# Patient Record
Sex: FEMALE | Race: WHITE | Employment: UNEMPLOYED | URBAN - METROPOLITAN AREA
[De-identification: names, ages, dates, MRNs, and addresses within clinical notes are randomized per-mention and may not be internally consistent; named-entity substitution may affect disease eponyms.]

---

## 2018-04-04 PROBLEM — G89.29 CHRONIC PAIN: Status: ACTIVE | Noted: 2018-04-04

## 2018-04-04 PROBLEM — I63.9 CVA (CEREBRAL VASCULAR ACCIDENT) (HCC): Status: ACTIVE | Noted: 2018-04-04

## 2018-04-04 PROBLEM — I25.10 CAD (CORONARY ARTERY DISEASE): Status: ACTIVE | Noted: 2018-04-04

## 2022-03-19 PROBLEM — I63.9 CVA (CEREBRAL VASCULAR ACCIDENT) (HCC): Status: ACTIVE | Noted: 2018-04-04

## 2022-03-19 PROBLEM — G89.29 CHRONIC PAIN: Status: ACTIVE | Noted: 2018-04-04

## 2023-02-20 ENCOUNTER — APPOINTMENT (OUTPATIENT)
Dept: GENERAL RADIOLOGY | Age: 63
End: 2023-02-20
Attending: EMERGENCY MEDICINE
Payer: MEDICARE

## 2023-02-20 ENCOUNTER — HOSPITAL ENCOUNTER (OUTPATIENT)
Age: 63
Setting detail: OBSERVATION
Discharge: HOME | End: 2023-02-20
Attending: EMERGENCY MEDICINE | Admitting: INTERNAL MEDICINE
Payer: MEDICARE

## 2023-02-20 VITALS
RESPIRATION RATE: 22 BRPM | OXYGEN SATURATION: 93 % | WEIGHT: 165 LBS | SYSTOLIC BLOOD PRESSURE: 126 MMHG | HEART RATE: 82 BPM | DIASTOLIC BLOOD PRESSURE: 63 MMHG | HEIGHT: 65 IN | TEMPERATURE: 97.4 F | BODY MASS INDEX: 27.49 KG/M2

## 2023-02-20 DIAGNOSIS — G43.009 MIGRAINE WITHOUT AURA AND WITHOUT STATUS MIGRAINOSUS, NOT INTRACTABLE: ICD-10-CM

## 2023-02-20 DIAGNOSIS — R10.13 ABDOMINAL PAIN, EPIGASTRIC: ICD-10-CM

## 2023-02-20 DIAGNOSIS — I10 HYPERTENSION, UNSPECIFIED TYPE: ICD-10-CM

## 2023-02-20 DIAGNOSIS — R77.8 ELEVATED TROPONIN: Primary | ICD-10-CM

## 2023-02-20 PROBLEM — D72.829 LEUKOCYTOSIS: Status: ACTIVE | Noted: 2023-02-20

## 2023-02-20 PROBLEM — F41.9 ANXIETY AND DEPRESSION: Status: ACTIVE | Noted: 2023-02-20

## 2023-02-20 PROBLEM — R11.2 NAUSEA & VOMITING: Status: ACTIVE | Noted: 2023-02-20

## 2023-02-20 PROBLEM — R79.89 ELEVATED TROPONIN: Status: RESOLVED | Noted: 2023-02-20 | Resolved: 2023-02-20

## 2023-02-20 PROBLEM — R79.89 ELEVATED TROPONIN: Status: ACTIVE | Noted: 2023-02-20

## 2023-02-20 PROBLEM — R21 RASH: Status: ACTIVE | Noted: 2023-02-20

## 2023-02-20 PROBLEM — I63.9 CVA (CEREBRAL VASCULAR ACCIDENT) (HCC): Status: RESOLVED | Noted: 2018-04-04 | Resolved: 2023-02-20

## 2023-02-20 PROBLEM — F32.A ANXIETY AND DEPRESSION: Status: ACTIVE | Noted: 2023-02-20

## 2023-02-20 LAB
ALBUMIN SERPL-MCNC: 3.9 G/DL (ref 3.5–5)
ALBUMIN/GLOB SERPL: 1.3 (ref 1.1–2.2)
ALP SERPL-CCNC: 83 U/L (ref 45–117)
ALT SERPL-CCNC: 52 U/L (ref 12–78)
ANION GAP SERPL CALC-SCNC: 11 MMOL/L (ref 5–15)
APPEARANCE UR: ABNORMAL
AST SERPL-CCNC: 29 U/L (ref 15–37)
B PERT DNA SPEC QL NAA+PROBE: NOT DETECTED
BACTERIA URNS QL MICRO: NEGATIVE /HPF
BASOPHILS # BLD: 0 K/UL (ref 0–0.1)
BASOPHILS NFR BLD: 0 % (ref 0–1)
BILIRUB SERPL-MCNC: 0.4 MG/DL (ref 0.2–1)
BILIRUB UR QL: NEGATIVE
BORDETELLA PARAPERTUSSIS PCR, BORPAR: NOT DETECTED
BUN SERPL-MCNC: 31 MG/DL (ref 6–20)
BUN/CREAT SERPL: 30 (ref 12–20)
C PNEUM DNA SPEC QL NAA+PROBE: NOT DETECTED
CALCIUM SERPL-MCNC: 10.4 MG/DL (ref 8.5–10.1)
CHLORIDE SERPL-SCNC: 100 MMOL/L (ref 97–108)
CHOLEST SERPL-MCNC: 136 MG/DL
CO2 SERPL-SCNC: 29 MMOL/L (ref 21–32)
COLOR UR: ABNORMAL
COMMENT, HOLDF: NORMAL
CREAT SERPL-MCNC: 1.02 MG/DL (ref 0.55–1.02)
DIFFERENTIAL METHOD BLD: ABNORMAL
EOSINOPHIL # BLD: 0.6 K/UL (ref 0–0.4)
EOSINOPHIL NFR BLD: 3 % (ref 0–7)
EPITH CASTS URNS QL MICRO: ABNORMAL /LPF
ERYTHROCYTE [DISTWIDTH] IN BLOOD BY AUTOMATED COUNT: 13.8 % (ref 11.5–14.5)
ETHANOL SERPL-MCNC: <10 MG/DL
FLUAV SUBTYP SPEC NAA+PROBE: NOT DETECTED
FLUBV RNA SPEC QL NAA+PROBE: NOT DETECTED
GLOBULIN SER CALC-MCNC: 3 G/DL (ref 2–4)
GLUCOSE SERPL-MCNC: 116 MG/DL (ref 65–100)
GLUCOSE UR STRIP.AUTO-MCNC: NEGATIVE MG/DL
HADV DNA SPEC QL NAA+PROBE: NOT DETECTED
HCOV 229E RNA SPEC QL NAA+PROBE: NOT DETECTED
HCOV HKU1 RNA SPEC QL NAA+PROBE: NOT DETECTED
HCOV NL63 RNA SPEC QL NAA+PROBE: NOT DETECTED
HCOV OC43 RNA SPEC QL NAA+PROBE: NOT DETECTED
HCT VFR BLD AUTO: 45.5 % (ref 35–47)
HDLC SERPL-MCNC: 59 MG/DL
HDLC SERPL: 2.3 (ref 0–5)
HGB BLD-MCNC: 15.5 G/DL (ref 11.5–16)
HGB UR QL STRIP: NEGATIVE
HMPV RNA SPEC QL NAA+PROBE: NOT DETECTED
HPIV1 RNA SPEC QL NAA+PROBE: NOT DETECTED
HPIV2 RNA SPEC QL NAA+PROBE: NOT DETECTED
HPIV3 RNA SPEC QL NAA+PROBE: NOT DETECTED
HPIV4 RNA SPEC QL NAA+PROBE: NOT DETECTED
IMM GRANULOCYTES # BLD AUTO: 0 K/UL
IMM GRANULOCYTES NFR BLD AUTO: 0 %
KETONES UR QL STRIP.AUTO: NEGATIVE MG/DL
LDLC SERPL CALC-MCNC: 44.2 MG/DL (ref 0–100)
LEUKOCYTE ESTERASE UR QL STRIP.AUTO: NEGATIVE
LYMPHOCYTES # BLD: 8.1 K/UL (ref 0.8–3.5)
LYMPHOCYTES NFR BLD: 43 % (ref 12–49)
M PNEUMO DNA SPEC QL NAA+PROBE: NOT DETECTED
MCH RBC QN AUTO: 31.8 PG (ref 26–34)
MCHC RBC AUTO-ENTMCNC: 34.1 G/DL (ref 30–36.5)
MCV RBC AUTO: 93.4 FL (ref 80–99)
MONOCYTES # BLD: 1.5 K/UL (ref 0–1)
MONOCYTES NFR BLD: 8 % (ref 5–13)
NEUTS BAND NFR BLD MANUAL: 2 % (ref 0–6)
NEUTS SEG # BLD: 8.7 K/UL (ref 1.8–8)
NEUTS SEG NFR BLD: 44 % (ref 32–75)
NITRITE UR QL STRIP.AUTO: NEGATIVE
NRBC # BLD: 0 K/UL (ref 0–0.01)
NRBC BLD-RTO: 0 PER 100 WBC
PH UR STRIP: 7.5 (ref 5–8)
PLATELET # BLD AUTO: 200 K/UL (ref 150–400)
PMV BLD AUTO: 11.1 FL (ref 8.9–12.9)
POTASSIUM SERPL-SCNC: 3.3 MMOL/L (ref 3.5–5.1)
PROCALCITONIN SERPL-MCNC: <0.05 NG/ML
PROT SERPL-MCNC: 6.9 G/DL (ref 6.4–8.2)
PROT UR STRIP-MCNC: ABNORMAL MG/DL
RBC # BLD AUTO: 4.87 M/UL (ref 3.8–5.2)
RBC #/AREA URNS HPF: ABNORMAL /HPF (ref 0–5)
RBC MORPH BLD: ABNORMAL
RSV RNA SPEC QL NAA+PROBE: NOT DETECTED
RV+EV RNA SPEC QL NAA+PROBE: NOT DETECTED
SAMPLES BEING HELD,HOLD: NORMAL
SARS-COV-2 RDRP RESP QL NAA+PROBE: NOT DETECTED
SARS-COV-2 RNA RESP QL NAA+PROBE: NOT DETECTED
SODIUM SERPL-SCNC: 140 MMOL/L (ref 136–145)
SOURCE, COVRS: NORMAL
SP GR UR REFRACTOMETRY: 1.02 (ref 1–1.03)
TRIGL SERPL-MCNC: 164 MG/DL (ref ?–150)
TROPONIN I SERPL HS-MCNC: 12 NG/L (ref 0–51)
TROPONIN I SERPL HS-MCNC: 52 NG/L (ref 0–51)
TROPONIN I SERPL HS-MCNC: 68 NG/L (ref 0–51)
UR CULT HOLD, URHOLD: NORMAL
UROBILINOGEN UR QL STRIP.AUTO: 0.2 EU/DL (ref 0.2–1)
VLDLC SERPL CALC-MCNC: 32.8 MG/DL
WBC # BLD AUTO: 18.9 K/UL (ref 3.6–11)
WBC MORPH BLD: ABNORMAL
WBC URNS QL MICRO: ABNORMAL /HPF (ref 0–4)

## 2023-02-20 PROCEDURE — 0202U NFCT DS 22 TRGT SARS-COV-2: CPT

## 2023-02-20 PROCEDURE — 87635 SARS-COV-2 COVID-19 AMP PRB: CPT

## 2023-02-20 PROCEDURE — 93005 ELECTROCARDIOGRAM TRACING: CPT

## 2023-02-20 PROCEDURE — 84484 ASSAY OF TROPONIN QUANT: CPT

## 2023-02-20 PROCEDURE — 74011250636 HC RX REV CODE- 250/636: Performed by: EMERGENCY MEDICINE

## 2023-02-20 PROCEDURE — 74011250637 HC RX REV CODE- 250/637: Performed by: EMERGENCY MEDICINE

## 2023-02-20 PROCEDURE — 96375 TX/PRO/DX INJ NEW DRUG ADDON: CPT

## 2023-02-20 PROCEDURE — 74011250637 HC RX REV CODE- 250/637: Performed by: INTERNAL MEDICINE

## 2023-02-20 PROCEDURE — 85025 COMPLETE CBC W/AUTO DIFF WBC: CPT

## 2023-02-20 PROCEDURE — 80053 COMPREHEN METABOLIC PANEL: CPT

## 2023-02-20 PROCEDURE — 99285 EMERGENCY DEPT VISIT HI MDM: CPT

## 2023-02-20 PROCEDURE — 84145 PROCALCITONIN (PCT): CPT

## 2023-02-20 PROCEDURE — G0378 HOSPITAL OBSERVATION PER HR: HCPCS

## 2023-02-20 PROCEDURE — 74011250636 HC RX REV CODE- 250/636: Performed by: INTERNAL MEDICINE

## 2023-02-20 PROCEDURE — 82077 ASSAY SPEC XCP UR&BREATH IA: CPT

## 2023-02-20 PROCEDURE — 71045 X-RAY EXAM CHEST 1 VIEW: CPT

## 2023-02-20 PROCEDURE — 81001 URINALYSIS AUTO W/SCOPE: CPT

## 2023-02-20 PROCEDURE — 96374 THER/PROPH/DIAG INJ IV PUSH: CPT

## 2023-02-20 PROCEDURE — 80061 LIPID PANEL: CPT

## 2023-02-20 PROCEDURE — 74011000250 HC RX REV CODE- 250: Performed by: EMERGENCY MEDICINE

## 2023-02-20 PROCEDURE — 36415 COLL VENOUS BLD VENIPUNCTURE: CPT

## 2023-02-20 RX ORDER — ONDANSETRON 2 MG/ML
4 INJECTION INTRAMUSCULAR; INTRAVENOUS
Status: DISCONTINUED | OUTPATIENT
Start: 2023-02-20 | End: 2023-02-20 | Stop reason: HOSPADM

## 2023-02-20 RX ORDER — METOPROLOL TARTRATE 25 MG/1
25 TABLET, FILM COATED ORAL ONCE
Status: COMPLETED | OUTPATIENT
Start: 2023-02-20 | End: 2023-02-20

## 2023-02-20 RX ORDER — FAMOTIDINE 20 MG/1
40 TABLET, FILM COATED ORAL 2 TIMES DAILY
Status: DISCONTINUED | OUTPATIENT
Start: 2023-02-20 | End: 2023-02-20 | Stop reason: HOSPADM

## 2023-02-20 RX ORDER — ONDANSETRON 4 MG/1
8 TABLET, ORALLY DISINTEGRATING ORAL
Status: COMPLETED | OUTPATIENT
Start: 2023-02-20 | End: 2023-02-20

## 2023-02-20 RX ORDER — ACETAMINOPHEN 500 MG
1000 TABLET ORAL ONCE
Status: COMPLETED | OUTPATIENT
Start: 2023-02-20 | End: 2023-02-20

## 2023-02-20 RX ORDER — METOPROLOL SUCCINATE 25 MG/1
50 TABLET, EXTENDED RELEASE ORAL EVERY EVENING
Status: DISCONTINUED | OUTPATIENT
Start: 2023-02-20 | End: 2023-02-20 | Stop reason: HOSPADM

## 2023-02-20 RX ORDER — FAMOTIDINE 20 MG/1
20 TABLET, FILM COATED ORAL ONCE
Status: DISCONTINUED | OUTPATIENT
Start: 2023-02-20 | End: 2023-02-20

## 2023-02-20 RX ORDER — ACETAMINOPHEN 650 MG/1
650 SUPPOSITORY RECTAL
Status: DISCONTINUED | OUTPATIENT
Start: 2023-02-20 | End: 2023-02-20 | Stop reason: HOSPADM

## 2023-02-20 RX ORDER — GUAIFENESIN 100 MG/5ML
324 LIQUID (ML) ORAL
Status: COMPLETED | OUTPATIENT
Start: 2023-02-20 | End: 2023-02-20

## 2023-02-20 RX ORDER — SODIUM CHLORIDE 0.9 % (FLUSH) 0.9 %
5-40 SYRINGE (ML) INJECTION EVERY 8 HOURS
Status: DISCONTINUED | OUTPATIENT
Start: 2023-02-20 | End: 2023-02-20 | Stop reason: HOSPADM

## 2023-02-20 RX ORDER — GABAPENTIN 300 MG/1
300 CAPSULE ORAL 2 TIMES DAILY
Status: DISCONTINUED | OUTPATIENT
Start: 2023-02-20 | End: 2023-02-20 | Stop reason: HOSPADM

## 2023-02-20 RX ORDER — POLYETHYLENE GLYCOL 3350 17 G/17G
17 POWDER, FOR SOLUTION ORAL DAILY PRN
Status: DISCONTINUED | OUTPATIENT
Start: 2023-02-20 | End: 2023-02-20 | Stop reason: HOSPADM

## 2023-02-20 RX ORDER — DIPHENHYDRAMINE HYDROCHLORIDE 50 MG/ML
50 INJECTION, SOLUTION INTRAMUSCULAR; INTRAVENOUS
Status: COMPLETED | OUTPATIENT
Start: 2023-02-20 | End: 2023-02-20

## 2023-02-20 RX ORDER — SODIUM CHLORIDE 0.9 % (FLUSH) 0.9 %
5-40 SYRINGE (ML) INJECTION AS NEEDED
Status: DISCONTINUED | OUTPATIENT
Start: 2023-02-20 | End: 2023-02-20 | Stop reason: HOSPADM

## 2023-02-20 RX ORDER — BUTALBITAL, ACETAMINOPHEN AND CAFFEINE 50; 325; 40 MG/1; MG/1; MG/1
1 TABLET ORAL
Status: DISCONTINUED | OUTPATIENT
Start: 2023-02-20 | End: 2023-02-20 | Stop reason: HOSPADM

## 2023-02-20 RX ORDER — POTASSIUM CHLORIDE, DEXTROSE MONOHYDRATE AND SODIUM CHLORIDE 300; 5; 900 MG/100ML; G/100ML; MG/100ML
INJECTION, SOLUTION INTRAVENOUS CONTINUOUS
Status: DISCONTINUED | OUTPATIENT
Start: 2023-02-20 | End: 2023-02-20 | Stop reason: HOSPADM

## 2023-02-20 RX ORDER — ACETAMINOPHEN 325 MG/1
650 TABLET ORAL
Status: DISCONTINUED | OUTPATIENT
Start: 2023-02-20 | End: 2023-02-20 | Stop reason: HOSPADM

## 2023-02-20 RX ORDER — ONDANSETRON 4 MG/1
4 TABLET, ORALLY DISINTEGRATING ORAL
Status: DISCONTINUED | OUTPATIENT
Start: 2023-02-20 | End: 2023-02-20 | Stop reason: HOSPADM

## 2023-02-20 RX ORDER — PROCHLORPERAZINE EDISYLATE 5 MG/ML
10 INJECTION INTRAMUSCULAR; INTRAVENOUS
Status: COMPLETED | OUTPATIENT
Start: 2023-02-20 | End: 2023-02-20

## 2023-02-20 RX ORDER — ENOXAPARIN SODIUM 100 MG/ML
40 INJECTION SUBCUTANEOUS DAILY
Status: DISCONTINUED | OUTPATIENT
Start: 2023-02-20 | End: 2023-02-20 | Stop reason: HOSPADM

## 2023-02-20 RX ORDER — HYDROXYZINE 25 MG/1
25 TABLET, FILM COATED ORAL
Status: COMPLETED | OUTPATIENT
Start: 2023-02-20 | End: 2023-02-20

## 2023-02-20 RX ORDER — GUAIFENESIN 100 MG/5ML
81 LIQUID (ML) ORAL DAILY
Status: DISCONTINUED | OUTPATIENT
Start: 2023-02-20 | End: 2023-02-20 | Stop reason: HOSPADM

## 2023-02-20 RX ORDER — ATORVASTATIN CALCIUM 20 MG/1
10 TABLET, FILM COATED ORAL
Status: DISCONTINUED | OUTPATIENT
Start: 2023-02-20 | End: 2023-02-20 | Stop reason: HOSPADM

## 2023-02-20 RX ADMIN — ACETAMINOPHEN 1000 MG: 500 TABLET ORAL at 02:00

## 2023-02-20 RX ADMIN — DIPHENHYDRAMINE HYDROCHLORIDE 50 MG: 50 INJECTION, SOLUTION INTRAMUSCULAR; INTRAVENOUS at 02:00

## 2023-02-20 RX ADMIN — PROCHLORPERAZINE EDISYLATE 10 MG: 5 INJECTION INTRAMUSCULAR; INTRAVENOUS at 01:59

## 2023-02-20 RX ADMIN — SODIUM CHLORIDE 500 ML: 9 INJECTION, SOLUTION INTRAVENOUS at 01:56

## 2023-02-20 RX ADMIN — ONDANSETRON 8 MG: 4 TABLET, ORALLY DISINTEGRATING ORAL at 02:00

## 2023-02-20 RX ADMIN — ASPIRIN 81 MG: 81 TABLET, CHEWABLE ORAL at 09:00

## 2023-02-20 RX ADMIN — GABAPENTIN 300 MG: 300 CAPSULE ORAL at 09:00

## 2023-02-20 RX ADMIN — ALUMINUM HYDROXIDE, MAGNESIUM HYDROXIDE, AND SIMETHICONE 40 ML: 200; 200; 20 SUSPENSION ORAL at 07:54

## 2023-02-20 RX ADMIN — FAMOTIDINE 20 MG: 10 INJECTION, SOLUTION INTRAVENOUS at 01:59

## 2023-02-20 RX ADMIN — ASPIRIN 324 MG: 81 TABLET, CHEWABLE ORAL at 02:00

## 2023-02-20 RX ADMIN — BUTALBITAL, ACETAMINOPHEN, AND CAFFEINE 1 TABLET: 50; 325; 40 TABLET ORAL at 09:00

## 2023-02-20 RX ADMIN — ALUMINUM HYDROXIDE AND MAGNESIUM HYDROXIDE 15 ML: 200; 200 SUSPENSION ORAL at 10:28

## 2023-02-20 RX ADMIN — HYDROXYZINE HYDROCHLORIDE 25 MG: 25 TABLET, FILM COATED ORAL at 07:54

## 2023-02-20 RX ADMIN — FAMOTIDINE 40 MG: 20 TABLET, FILM COATED ORAL at 07:54

## 2023-02-20 RX ADMIN — METOPROLOL TARTRATE 25 MG: 25 TABLET, FILM COATED ORAL at 02:01

## 2023-02-20 NOTE — PROGRESS NOTES
Office [facility] (8/14/19): Indication:  Cardiac risk stratification and evaluation of ischemia. Procedure: The patient's heart rate was 76 bpm at baseline. Resting BP was 130/84. Patient had a total dose of 0.4mg/5ml injection of Regadenoson immediately flushed with 5 ml of saline intravenously administered over 10 seconds. Following the injection, heart rate was 107 bpm and laurel BP was 120/80. Symptoms during stress include headache, shortness of breath and Heavy all over, cough. Imaging procedure: The patient had myocardial perfusion imaging performed using a 1 day stress imaging protocol. with the intravenous injection of 29.5 mCi of Tc99m Tetrofosmin Myoview at stress, 75 mg of IV aminophylline was required. Baseline electrocardiogram demonstrates normal sinus rhythm and is within normal limits. The patient had no EKG changes suggestive of ischemia. Findings: The overall quality of the study was good. The left ventricle was normal in size. There were no myocardial perfusion defects. LVEF was calculated at 55%. Impression: Normal myocardial perfusion Tetrofosmin SPECT imaging.

## 2023-02-20 NOTE — ED NOTES
Report received from [address]. This RN introduced self to patient this AM. Pt was unhooked from monitor at this time. This RN put patient back on monitor. Pt complains of her headache and CP coming back. EKG completed and doctor notified.
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
## 2023-02-20 NOTE — DISCHARGE SUMMARY
Physician Discharge Summary     Patient ID:  Darron Noel  [id number]  73 y.o.  1960    Admit date: 2/20/2023    Discharge date of service and time: 2/20/2023  Greater than 30 minutes were spent providing discharge related services for this patient    Admission Diagnoses: Elevated troponin [R77.8]    Discharge Diagnoses:    Principal Diagnosis   Nausea & vomiting                                             Hospital Course and other diagnoses    Nausea & vomiting / Headache - POA, mild, resolving. Likely anxiety vs food poisoning vs viral. Clears. ADAT. Prn zofran. Xray abd if worsens. DC home if she eats     Dehydration / Hypokalemia / Hypercalcemia - POA due to poor PO intake, and GI loss. Hydrate and replete K in IVF. Chronic pain / Scoliosis / DDD (degenerative disc disease), lumbar / Arthritis / Cervical myopathy - POA, followed by pain specialist. Continue gabapentin     CAD (coronary artery disease) / Hypertension / Hyperlipidemia - POA, chronic, per patient. I revived external records from [facility]. Minimally elevated troponin is due to strain in setting of dehydraiton. EKG bland. No further workup warranted. Resume ASA, metoprolol and statin. Anxiety and depression / Fibromyalgia - POA, reports PRN xanax. Severe, and likely responsible for pain, dyspnea, etc.  Would benefit from counseling and SSRI or similar agents. Leukocytosis - POA, likely reactive or viral.  Xary without PNA. UA without UTI. Negative procalcitonin. No Abx     Rash - POA, mild, likely stress vs viral or contact. Has been on steroid taper for this. Has seen allergist. Supportive care. Outpatient derm if persists      PCP: Amelia Velásquez MD    Consults: None    Significant Diagnostic Studies: See Hospital Course    Discharged home in improved condition.     Discharge Exam:  BP (!) 142/89   Pulse 62   Temp 97.7 °F (36.5 °C)   Resp 18   Ht 5' 5\" (1.651 m)   Wt 74.8 kg (165 lb)   SpO2 95%   BMI 27.46 kg/m² Gen:  Well-developed, well-nourished, in no acute distress  HEENT:  Pink conjunctivae, PERRL, hearing intact to voice, moist mucous membranes  Neck:  Supple, without masses, thyroid non-tender  Resp:  No accessory muscle use, clear breath sounds without wheezes rales or rhonchi  Card:  No murmurs, normal S1, S2 without thrills, bruits or peripheral edema  Abd:  Soft, non-tender, non-distended, normoactive bowel sounds are present, no mass  Lymph:  No cervical or inguinal adenopathy  Musc:  No cyanosis or clubbing  Skin:  No rashes or ulcers, skin turgor is good  Neuro:  Cranial nerves are grossly intact, no focal motor weakness, follows commands appropriately  Psych: Moderate insight, oriented to person, place and time, alert    Patient Instructions:   Current Discharge Medication List        CONTINUE these medications which have NOT CHANGED    Details   metoprolol succinate (TOPROL-XL) 50 mg XL tablet TAKE 1 TABLET BY MOUTH EVERY DAY  Qty: 30 Tab, Refills: 5    Associated Diagnoses: Essential hypertension      lovastatin (MEVACOR) 40 mg tablet Take 1 Tab by mouth daily. Qty: 90 Tab, Refills: 1      gabapentin (NEURONTIN) 300 mg capsule Take 600 mg by mouth two (2) times a day. butalbital-acetaminophen-caffeine (FIORICET, ESGIC) -40 mg per tablet TAKE 1 TABLET BY MOUTH EVERY DAY AS NEEDED  Qty: 30 Tab, Refills: 1    Associated Diagnoses: Intractable migraine without status migrainosus, unspecified migraine type      ALPRAZolam (XANAX) 0.5 mg tablet TAKE 1 TABLET BY MOUTH EVERY DAY AT BEDTIME AS NEEDED FOR ANXIETY  Qty: 30 Tab, Refills: 0    Comments: Not to exceed 5 additional fills before 08/17/2019  Associated Diagnoses: Anxiety      aspirin (ASPIRIN) 325 mg tablet Take 1 Tab by mouth daily.   Qty: 30 Tab, Refills: 0           STOP taking these medications       leflunomide (ARAVA) 20 mg tablet Comments:   Reason for Stopping:             Activity: Activity as tolerated  Diet: Cardiac Diet and Low fat, Low cholesterol  Wound Care: None needed    Follow-up with your PCP in a few weeks.   Follow-up tests/labs - none    Signed:  Liang Mc MD  2/20/2023  10:52 AM

## 2023-02-20 NOTE — PROGRESS NOTES
Medicare Outpatient Observation Notice (MOON)/ [facility] Outpatient Observation Notice (Sara Regan) provided to patient/representative with verbal explanation of the notice. Time allotted for questions regarding the notice. Patient /representative provided a completed copy of the MOON/VOON notice. Copy placed on bedside chart.   Christian Hay CMS

## 2023-02-20 NOTE — ED NOTES
Patient reports pain with swallowing water and heart rhythm changed during episode. EKG performed and troponin sent. ED MD notified and Dr. Taylor Adams notified. Orders received to administer Mylanta.

## 2023-02-20 NOTE — ED NOTES
I reexamined this patient upon arrival, she presented as a transfer from the [facility] emergency department. Continues to have episodic substernal chest pain with intermittent sinus pauses and bradycardia to the upper 30s, low 40s while maintaining good systolic pressures and normal mentation. Repeat EKG at 05 55 was reviewed by me and indicates sinus bradycardia with sinus arrhythmia at a rate of 46, axis is normal, QRS interval is normal, there are no ST or T wave changes suggesting acute ischemia. We've repeated her troponin which is pending and I've paged the on call cardiologist for her practice, Dr. Leidy Diaz. She continues to smoke half a pack per day, has an established history of coronary artery disease by cardiac cath in 2007, has not had a repeat cath since then. I've made the admitting hospitalist (Dr. Taylor Adams) aware as well.

## 2023-02-20 NOTE — ED TRIAGE NOTES
Pt presents to the ER via ambulance with c/o HA, nausea, vomiting onset tonight. Also reports rash x 2 weeks that \"driving me crazy\". On arrival patient off of EMS stretcher and ambulatory to bathroom, urine sample obtained. Patient ambulated to bathroom and back to room with steady gait.
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
## 2023-02-20 NOTE — PROGRESS NOTES
Dr. Doc Iraheta office note from 8/13/19:      James Billings 1960   Office/Outpatient Visit  Visit Date: Tue, Aug 13, 2019 10:15 am  Provider: Jacqueline Del Toro MD (Assistant: Moises Hoffman LPN, LPN)  Location: [facility])- [address] [place] [address] Louann Levine. [id number] [phone]    Electronically signed by Jacqueline Del Toro MD on  08/13/2019 11:14:04 AM                           SUBJECTIVE:    CC:   Mrs. Angie Youssef is a 64year old White female. Patient states her PCP has been changed to Shashi Bellamy MD.  New Patient- Not seen in 3 years This is a recent hospital stay follow-up visit. She is here today following a transition of care from the emergency department. She was recently in the hospital: on a recent  ER visit on 5/21/19 with chest pain at [place] [address]. She has a history of coronary artery disease of the native coronary artery, hypercholesterolemia,  essential hypertension, myocardial infarction, and transient ischemic attack (TIA). She presents for evaluation of chest pain. HPI:         Regarding CAD: She is here today hospital follow-up. Current symptoms include chest pain. The patient has had prior echocardiogram ( performed 4/5/18- EF 60-65% ). A cardiac cath has been done ( performed 2005 ). Regarding hypertension: Type Primary Hypertension Currently, her treatment regimen consists of Toprol-XL. TIA noted. No more that she is aware of. Told her right carotid artery was classified. Hypercholesterolemia: Current treatment includes Mevacor and diet. Regarding chest pain: She characterizes the pain as sharp. Went to [facility] pain management in April - given Topamax for pain in back and then started having CP. She called them told her to go to ER - May 21st - did EKG and this was good and not sure if did blood work at that time per pt. Review of records they did a Troponin and was ok.    Since stopping medication still has chest pain and radiate to her left shoulder. Will also have episodes of sharp pain. The pain will ease up if takes a couple of 325 mg of aspirin she states. Not pleuritic in nature. Does not hurt when lifts arms above head. Also states soreness in chest on left side and different from the \"constant pain\". She is SOB at times carlyle if laying on her back. Also found nodule on thyroid she states. Also states when walks she can stumble to the right. ROS:   Discussed relevant lab and imaging studies along with the plan of treatment with the nurse. EYES:  Negative for blurred vision and eye pain. E/N/T:  Negative for epistaxis and hoarseness. CARDIOVASCULAR:  Please see HPI. RESPIRATORY:  Negative for cough and hemoptysis. GASTROINTESTINAL:  Negative for abdominal pain and dysphagia. MUSCULOSKELETAL:  Negative for arthralgias and back pain. NEUROLOGICAL:  Negative for headaches, paresthesias, and weakness. HEMATOLOGIC/LYMPHATIC:  Negative for easy bruising, bleeding, and lymphadenopathy. PSYCHIATRIC:  No symptoms reported. PMH/FMH/SH:   Last Reviewed on 8/13/2019 10:58 AM by Ladan Hale LPN  Past Medical History:     Coronary artery disease  Hypercholesterolemia  Hypertension  Myocardial Infarction  Echocardiogram 04/07/05 - Normal LV systolic performance. Trace MR and mild TR. Stress test 6 minutes under Ryan protocol. Basal septal perfusion defect noted. stress echo 1/22/09 (normal)   Thyroid nodule   Transient Ischemic Attack   INFLUENZA VACCINE: declined     Past Cardiac Procedures:  Echocardiogram:  3/21/16 - Normal LV systolic function with an estimated ejection fraction of 60%. No significant valvular lesions. Echocardiogram: 4/5/18: EF 60-65% no regional wall motion abnormalities.  eval for TIA suspected cardiac source of embolism not found     Surgical History:   Surgical/Procedural History:   Multiple leg surgeries  3 cervical discs removed 11/2007   Cardiac catheterization 04/08/05 - minimal coronary artery disease proximal aspect of the LAD. Family History:   FAMILY HISTORY:   Father:  at age 43; Cause of death was CHF. Mother: Healthy. Social History:   SOCIAL HISTORY:   Marital Status:    Children: 1 child     Tobacco/Alcohol/Supplements:   Last Reviewed on 2019 10:58 AM by Leda Granger LPN  TOBACCO/ALCOHOL/SUPPLEMENTS   Tobacco: Current Smoker: Currently smokes cigarettes some days. Substance Abuse History:   Last Reviewed on 2019 10:58 AM by Marilyn Man LPN, [facility] History:   Last Reviewed on 2019 10:58 AM by Marilyn Man LPN, [address] (eg STDs): Last Reviewed on 2019 10:58 AM by Leda Granger LPN      Allergies:   Last Reviewed on 2019 10:58 AM by Leda Granger LPN  Sulfas:  Lisinopril:  Etodolac:    Current Medications:   Last Reviewed on 2019 10:58 AM by Leda Granger LPN  Gabapentin 300mg Tablet 1 po tid   Butalbital/Acetaminophen 50mg/325mg Tablet Take 1 tablet(s) by mouth q4h prn   Lovastatin 40mg Tablet 1 po qd   Metoprolol Succinate 50mg Tablets, Extended Release 1  po qd   Flutamide   Xanax     OBJECTIVE:    Vitals:     Historical:   2016  BP:   128/82 mm Hg ( (left arm, , standing, );) 2016  BP:   144/89 mm Hg ( (left arm, , sitting, );) 2016  Wt:   155lbs ( (Estimated, );)   Current: 2019 10:55:20 AM  Wt: 165 lbsBP: 137/85 mm Hg (left arm, sitting);  P: 82 bpm (sitting);  sCr: 1 mg/dL;  GFR: 61.56    Repeat:   10:55:54 AM     BP:   138/88mm Hg (left arm, standing)   Exams:   GENERAL:  Alert, oriented to person, place and time x3. EYES:  Normal lids without xanthelasma; conjunctiva unremarkable; no scleral icterus. HEENT:  Face symmetric, voice clear, extraocular muscles intact. NECK:  Supple. No bruits, No JVD. LUNGS:  Clear to auscultation. No rales, no wheezing. CARDIAC:  Regular rate and rhythm. PMI not displaced. ABDOMEN:  Soft. Positive bowel sounds. Non-tender. MUSCULOSKELETAL:  Normal range of motion, strength and tone. EXTREMITIES:  No edema. Good muscle tone and strength. SKIN: No significant rashes or lesions; no suspicious moles. NEUROLOGICAL:  No focal deficits, cranial nerves II-XII are grossly intact. PSYCHIATRIC:  Appropriate affect and demeanor; normal speech pattern; grossly normal memory. Lab/Test Results:     Sodium, Serum: 141 (05/21/2019),   Potassium, Serum: 3.7 (05/21/2019),   Glucose Serum: 105 (05/21/2019),   BUN serum/plasma (sCnc): 21 (05/21/2019),   Creatinine, Serum: 1.00 (05/21/2019),   eGFR: >60 (05/21/2019),   WBC count: 6.0 (05/21/2019),   RBC count: 4.24 (05/21/2019),   Hgb: 12.7 (05/21/2019),   Hct: 39.7 (05/21/2019),   MCV: 93.6 (05/21/2019),   Platelets: 208 (11/94/9201),       Procedures:   Coronary artery disease, of native coronary artery     ECG INTERPRETATION: See scanned EKG for results.         ASSESSMENT        414.01    I25.10    Coronary artery disease, of native coronary artery    401.1    I10    Essential hypertension, benign    435.9    TIA    272.0    E78.1    Hypercholesterolemia    786.51    R07.2    Chest pain      ORDERS:     Procedures Ordered:     [id number]  Education and train for pt self-mgmt by qualified, nonphysician, ea 30 minutes; individual pt   (Send-Out)          57244  Electrocardiogram, routine with at least 12 leads; with interpretation and report   (In-House)          Odilon Avina  Nuclear Stress Test with Persantine   (In-House)          Other Orders:     CJ170A  Queried Patient for Tobacco Use   (Send-Out)            ACE or ARB NOT Prescribed, reason not given   (In-House)            LDL-C >= 100 mg/dL   (Send-Out)          [id number]  NO Statin prescribed due to Reason not otherwise specified   (In-House)          [id number]  Aspirin or clopidogrel, not prescribed, reason not specified   (In-House)              PLAN:     Coronary artery disease, of native coronary artery   CAD: with ACE/ARB Rx without Diabetes or LVSD, ACE/ARB NOT Prescribed, Reason not given Lipid Control LDL >= 100 mg/dL NO Statin, Reason NOS NO Station prescribed due to Reason not otherwise specified with Antiplatelet Therapy Aspirin or Clopidogrel NOT Prescribed d/t Reason NOS     Medication list has been reviewed. Continue current medications. Smoking Status:  Nonsmoker     Testing/Procedures:  Nuclear Stress Test with Lexiscan - this week for CP. Schedule a follow-up appointment in 6 months. Referrals:  I am referring Mrs. Ivory to Dr Lalitha Mccurdy for thyroid. .      Discussed with patient diet, exercise plan and lifestyle modifications. The above note was transcribed by Riley Escudero and authenticated by Dr. Rachael Logan prior to sign off.

## 2023-02-20 NOTE — DISCHARGE INSTRUCTIONS
Patient Discharge Instructions    Charisma Lehman / [id number] : 1960    Admitted 2023 Discharged: 2023     Primary Diagnoses  Problem List as of 2023 Date Reviewed: 2023               * (Principal) Nausea & vomiting        Leukocytosis        Anxiety and depression        Rash        Scoliosis        Hypertension        Hyperlipidemia        Headache        Fibromyalgia        DDD (degenerative disc disease), lumbar        Cervical myopathy        CAD (coronary artery disease)        Arthritis        Chronic pain       Take Home Medications     It is important that you take the medication exactly as they are prescribed. Keep your medication in the bottles provided by the pharmacist and keep a list of the medication names, dosages, and times to be taken in your wallet. Do not take other medications without consulting your doctor. What to do at Home    Recommended diet: Cardiac Diet and Low fat, Low cholesterol    Recommended activity: Activity as tolerated    If you experience worse symptoms, please follow up with a psychologist.    Follow-up with your PCP in a few weeks    Follow-up Information       Follow up With Specialties Details Why Contact Info    Chuck Chao MD Family Medicine Schedule an appointment as soon as possible for a visit in 1 week(s)  [address] [place] [address]  [phone]      Miguel Ángel Gardiner MD Cardiovascular Disease Physician Schedule an appointment as soon as possible for a visit If symptoms worsen [facility]  [phone]               Information obtained by :  I understand that if any problems occur once I am at home I am to contact my physician. I understand and acknowledge receipt of the instructions indicated above.                                                                                                                                            Physician's or R.N.'s Signature Date/Time                                                                                                                                              Patient or Representative Signature                                                          Date/Time

## 2023-02-22 LAB
ATRIAL RATE: 46 BPM
ATRIAL RATE: 59 BPM
ATRIAL RATE: 62 BPM
CALCULATED P AXIS, ECG09: 57 DEGREES
CALCULATED P AXIS, ECG09: 67 DEGREES
CALCULATED P AXIS, ECG09: 73 DEGREES
CALCULATED R AXIS, ECG10: 56 DEGREES
CALCULATED R AXIS, ECG10: 60 DEGREES
CALCULATED R AXIS, ECG10: 62 DEGREES
CALCULATED T AXIS, ECG11: 45 DEGREES
CALCULATED T AXIS, ECG11: 52 DEGREES
CALCULATED T AXIS, ECG11: 56 DEGREES
DIAGNOSIS, 93000: NORMAL
P-R INTERVAL, ECG05: 114 MS
P-R INTERVAL, ECG05: 116 MS
P-R INTERVAL, ECG05: 120 MS
Q-T INTERVAL, ECG07: 392 MS
Q-T INTERVAL, ECG07: 434 MS
Q-T INTERVAL, ECG07: 440 MS
QRS DURATION, ECG06: 84 MS
QRS DURATION, ECG06: 84 MS
QRS DURATION, ECG06: 86 MS
QTC CALCULATION (BEZET), ECG08: 385 MS
QTC CALCULATION (BEZET), ECG08: 397 MS
QTC CALCULATION (BEZET), ECG08: 429 MS
VENTRICULAR RATE, ECG03: 46 BPM
VENTRICULAR RATE, ECG03: 59 BPM
VENTRICULAR RATE, ECG03: 62 BPM

## 2023-06-07 NOTE — ED NOTES
Spoke with pt. Informed pt she needs an appt. Offered pt appt with an SUMANTH. Pt declined. Pt stated she has not been able to see Dr. Nunez since 2021 & she is very frustrated because he is her PCP. Pt stated she has not been able to see him in over a year and she is upset because everytime she comes in for an appointment she sees an SUMANTH. Pt stated she does not mind seeing an SUMANTH but feel that she should be able to see her PCP once a year. Informed pt I understand. Pt scheduled to see Dr. Nunez 6/30 @ 1:00. Pt VU.    TRANSFER - OUT REPORT:    Verbal report given to Nneka Ross RN (name) on Marine Given  being transferred to [facility] ER (unit) for routine progression of care       Report consisted of patients Situation, Background, Assessment and   Recommendations(SBAR). Information from the following report(s) SBAR was reviewed with the receiving nurse. Lines:   Peripheral IV 02/20/23 Right Antecubital (Active)        Opportunity for questions and clarification was provided.       Patient transported with:   Monitor   H2H

## 2023-07-06 ENCOUNTER — HOSPITAL ENCOUNTER (OUTPATIENT)
Facility: HOSPITAL | Age: 63
End: 2023-07-06
Payer: MEDICARE

## 2023-07-06 ENCOUNTER — HOSPITAL ENCOUNTER (OUTPATIENT)
Facility: HOSPITAL | Age: 63
Discharge: HOME | End: 2023-07-06
Payer: MEDICARE

## 2023-07-06 VITALS — HEIGHT: 65 IN | WEIGHT: 165 LBS | BODY MASS INDEX: 27.49 KG/M2

## 2023-07-06 DIAGNOSIS — R51.0 ORTHOSTATIC HEADACHE: ICD-10-CM

## 2023-07-06 DIAGNOSIS — M47.812 SPONDYLOSIS, CERVICAL: ICD-10-CM

## 2023-07-06 DIAGNOSIS — M96.1 POSTLAMINECTOMY SYNDROME, LUMBAR REGION: ICD-10-CM

## 2023-07-06 PROCEDURE — 6360000004 HC RX CONTRAST MEDICATION: Performed by: RADIOLOGY

## 2023-07-06 PROCEDURE — A9579 GAD-BASE MR CONTRAST NOS,1ML: HCPCS | Performed by: RADIOLOGY

## 2023-07-06 PROCEDURE — 72141 MRI NECK SPINE W/O DYE: CPT

## 2023-07-06 PROCEDURE — 70553 MRI BRAIN STEM W/O & W/DYE: CPT

## 2023-07-06 PROCEDURE — 72148 MRI LUMBAR SPINE W/O DYE: CPT

## 2023-07-06 RX ADMIN — GADOTERIDOL 14 ML: 279.3 INJECTION, SOLUTION INTRAVENOUS at 08:54

## 2023-08-23 ENCOUNTER — HOSPITAL ENCOUNTER (OUTPATIENT)
Facility: HOSPITAL | Age: 63
Discharge: HOME | End: 2023-08-26
Attending: ORTHOPAEDIC SURGERY
Payer: MEDICARE

## 2023-08-23 DIAGNOSIS — M54.2 CERVICAL PAIN: ICD-10-CM

## 2023-08-23 PROCEDURE — 72125 CT NECK SPINE W/O DYE: CPT

## 2023-10-12 ENCOUNTER — APPOINTMENT (OUTPATIENT)
Facility: HOSPITAL | Age: 63
End: 2023-10-12
Payer: MEDICARE

## 2023-10-12 ENCOUNTER — HOSPITAL ENCOUNTER (EMERGENCY)
Facility: HOSPITAL | Age: 63
Discharge: HOME | End: 2023-10-12
Attending: EMERGENCY MEDICINE
Payer: MEDICARE

## 2023-10-12 VITALS
BODY MASS INDEX: 27.99 KG/M2 | WEIGHT: 168 LBS | TEMPERATURE: 97.7 F | OXYGEN SATURATION: 96 % | RESPIRATION RATE: 18 BRPM | HEIGHT: 65 IN | SYSTOLIC BLOOD PRESSURE: 172 MMHG | DIASTOLIC BLOOD PRESSURE: 93 MMHG | HEART RATE: 86 BPM

## 2023-10-12 DIAGNOSIS — M25.572 ACUTE LEFT ANKLE PAIN: ICD-10-CM
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
## 2023-10-12 DIAGNOSIS — M25.562 ACUTE PAIN OF LEFT KNEE: ICD-10-CM

## 2023-10-12 PROCEDURE — 73610 X-RAY EXAM OF ANKLE: CPT

## 2023-10-12 PROCEDURE — 99283 EMERGENCY DEPT VISIT LOW MDM: CPT

## 2023-10-12 PROCEDURE — 73590 X-RAY EXAM OF LOWER LEG: CPT

## 2023-10-12 PROCEDURE — 73562 X-RAY EXAM OF KNEE 3: CPT

## 2023-10-12 RX ORDER — HYDROCODONE BITARTRATE AND ACETAMINOPHEN 5; 325 MG/1; MG/1
1 TABLET ORAL EVERY 4 HOURS PRN
Qty: 18 TABLET | Refills: 0 | Status: SHIPPED | OUTPATIENT
Start: 2023-10-12 | End: 2023-10-15

## 2023-10-12 ASSESSMENT — PAIN DESCRIPTION - ORIENTATION: ORIENTATION: LEFT

## 2023-10-12 ASSESSMENT — PAIN DESCRIPTION - LOCATION: LOCATION: KNEE

## 2023-10-12 ASSESSMENT — PAIN SCALES - GENERAL: PAINLEVEL_OUTOF10: 10

## 2023-10-12 ASSESSMENT — PAIN DESCRIPTION - DESCRIPTORS: DESCRIPTORS: THROBBING

## 2023-10-12 NOTE — ED NOTES
The patient was discharged home by [facility]  in stable condition. The patient is alert and oriented, in no respiratory distress. The patient's diagnosis, condition and treatment were explained. The patient expressed understanding. Prescriptions given/e-scribed to pharmacy. No work/school note given. A discharge plan has been developed. A  was not involved in the process. Aftercare instructions were given. Pt ambulatory out of the ED with crutches refused wheel chair. Neptali Mccarthy RN  10/12/23 [id number]

## 2023-10-12 NOTE — ED NOTES
16\" knee immobilizer to left knee provided crutches fit to height with no instructions needed.      Kerri Pascal RN  10/12/23 2040

## 2023-10-12 NOTE — DISCHARGE INSTRUCTIONS
Discussed visit today. Please follow-up with Orthopedics by calling and scheduling an appointment. RICE - rest, ice, compression with brace and elevation. Return to the ER with worsening of symptoms.

## 2023-10-12 NOTE — ED PROVIDER NOTES
[facility] EMERGENCY DEPT  EMERGENCY DEPARTMENT ENCOUNTER      Pt Name: Jaron Jauregui  MRN: [id number]  [address] [facility] [place] 1960  Date of evaluation: 10/12/2023  Provider: Babs Edward PA-C    CHIEF COMPLAINT       Chief Complaint   Patient presents with    Leg Pain         HISTORY OF PRESENT ILLNESS    Patient is a 45-year-old female with history of anxiety/depression, arthritis, coronary artery disease, fibromyalgia, degenerative disc disease, hyperlipidemia, hypertension, and scoliosis who presents to the ER with reports of falling on water that was on her floor this morning. Patient reports that she fell and landed on her left knee. Patient reports she has had persistent pain, swelling, and limited range of motion in the left knee since this is happened. Patient reports that she has had extensive surgery on her left leg in the past due to broken bones. Patient also reports that her left ankle is more swollen than usual.  Patient denies hitting her head, loss of consciousness, nausea or vomiting, dizziness, headache, or lightheadedness. Patient has not taken anything for the pain prior to arrival.  Patient denies chest pain, shortness of breath, abdominal pain, nausea or vomiting, diarrhea constipation, fever, headache, or urinary symptoms. Patient denies alcohol use, everyday cigarette smoking, and denies illicit drug use. Nursing Notes were reviewed. REVIEW OF SYSTEMS       Review of Systems   Musculoskeletal:         Fall with left knee, ankle, and shin pain. All other systems reviewed and are negative.         PAST MEDICAL HISTORY     Past Medical History:   Diagnosis Date    Anxiety and depression     Arthritis     CAD (coronary artery disease)     Cervical myopathy     Chronic pain     fibromyalgia    DDD (degenerative disc disease), lumbar     Fibromyalgia     History of MI (myocardial infarction)     Hyperlipidemia     Hypertension     Scoliosis          SURGICAL HISTORY       Past Surgical

## 2023-10-12 NOTE — ED TRIAGE NOTES
Patient presents ambulatory to treatment area, noticeably favoring left leg. Patient states that she slipped in her home this morning, causing her to fall and land on her left knee. Patient reports persistent pain, swelling, and limited ROM in left knee. Swelling extends throughout entire leg. Patient also complaining of lower back and bilateral hip pain. Left ankle also more swollen than usual.  Patient has chronic issues with left ankle.

## 2024-03-18 ENCOUNTER — APPOINTMENT (OUTPATIENT)
Facility: HOSPITAL | Age: 64
End: 2024-03-18
Payer: MEDICARE

## 2024-03-18 ENCOUNTER — HOSPITAL ENCOUNTER (EMERGENCY)
Facility: HOSPITAL | Age: 64
Discharge: HOME | End: 2024-03-18
Attending: EMERGENCY MEDICINE
Payer: MEDICARE

## 2024-03-18 VITALS
WEIGHT: 170 LBS | HEIGHT: 65 IN | HEART RATE: 69 BPM | TEMPERATURE: 97.5 F | BODY MASS INDEX: 28.32 KG/M2 | RESPIRATION RATE: 21 BRPM | OXYGEN SATURATION: 98 % | SYSTOLIC BLOOD PRESSURE: 153 MMHG | DIASTOLIC BLOOD PRESSURE: 85 MMHG

## 2024-03-18 DIAGNOSIS — I10 HYPERTENSION, UNSPECIFIED TYPE: Primary | ICD-10-CM

## 2024-03-18 DIAGNOSIS — R20.2 PARESTHESIAS: ICD-10-CM

## 2024-03-18 DIAGNOSIS — R51.9 ACUTE NONINTRACTABLE HEADACHE, UNSPECIFIED HEADACHE TYPE: ICD-10-CM

## 2024-03-18 LAB
ALBUMIN SERPL-MCNC: 3.8 G/DL (ref 3.5–5)
ALBUMIN/GLOB SERPL: 1.3 (ref 1.1–2.2)
ALP SERPL-CCNC: 97 U/L (ref 45–117)
ALT SERPL-CCNC: 31 U/L (ref 12–78)
ANION GAP SERPL CALC-SCNC: 4 MMOL/L (ref 5–15)
AST SERPL-CCNC: 22 U/L (ref 15–37)
BASOPHILS # BLD: 0 K/UL (ref 0–0.1)
BASOPHILS NFR BLD: 1 % (ref 0–1)
BILIRUB SERPL-MCNC: 0.6 MG/DL (ref 0.2–1)
BUN SERPL-MCNC: 11 MG/DL (ref 6–20)
BUN/CREAT SERPL: 14 (ref 12–20)
CALCIUM SERPL-MCNC: 9.3 MG/DL (ref 8.5–10.1)
CHLORIDE SERPL-SCNC: 115 MMOL/L (ref 97–108)
CO2 SERPL-SCNC: 25 MMOL/L (ref 21–32)
COMMENT:: NORMAL
CREAT SERPL-MCNC: 0.78 MG/DL (ref 0.55–1.02)
DIFFERENTIAL METHOD BLD: ABNORMAL
EKG ATRIAL RATE: 72 BPM
EKG DIAGNOSIS: NORMAL
EKG P AXIS: 69 DEGREES
EKG P-R INTERVAL: 106 MS
EKG Q-T INTERVAL: 404 MS
EKG QRS DURATION: 76 MS
EKG QTC CALCULATION (BAZETT): 442 MS
EKG R AXIS: 61 DEGREES
EKG T AXIS: 46 DEGREES
EKG VENTRICULAR RATE: 72 BPM
EOSINOPHIL # BLD: 0.3 K/UL (ref 0–0.4)
EOSINOPHIL NFR BLD: 5 % (ref 0–7)
ERYTHROCYTE [DISTWIDTH] IN BLOOD BY AUTOMATED COUNT: 13.5 % (ref 11.5–14.5)
GLOBULIN SER CALC-MCNC: 2.9 G/DL (ref 2–4)
GLUCOSE SERPL-MCNC: 136 MG/DL (ref 65–100)
HCT VFR BLD AUTO: 41.4 % (ref 35–47)
HGB BLD-MCNC: 14 G/DL (ref 11.5–16)
IMM GRANULOCYTES # BLD AUTO: 0 K/UL (ref 0–0.04)
IMM GRANULOCYTES NFR BLD AUTO: 0 % (ref 0–0.5)
LYMPHOCYTES # BLD: 1.8 K/UL (ref 0.8–3.5)
LYMPHOCYTES NFR BLD: 31 % (ref 12–49)
MCH RBC QN AUTO: 31 PG (ref 26–34)
MCHC RBC AUTO-ENTMCNC: 33.8 G/DL (ref 30–36.5)
MCV RBC AUTO: 91.6 FL (ref 80–99)
MONOCYTES # BLD: 0.3 K/UL (ref 0–1)
MONOCYTES NFR BLD: 6 % (ref 5–13)
NEUTS SEG # BLD: 3.5 K/UL (ref 1.8–8)
NEUTS SEG NFR BLD: 58 % (ref 32–75)
NRBC # BLD: 0 K/UL (ref 0–0.01)
NRBC BLD-RTO: 0 PER 100 WBC
PLATELET # BLD AUTO: 129 K/UL (ref 150–400)
PMV BLD AUTO: 11.3 FL (ref 8.9–12.9)
POTASSIUM SERPL-SCNC: 3.7 MMOL/L (ref 3.5–5.1)
PROT SERPL-MCNC: 6.7 G/DL (ref 6.4–8.2)
RBC # BLD AUTO: 4.52 M/UL (ref 3.8–5.2)
SODIUM SERPL-SCNC: 144 MMOL/L (ref 136–145)
SPECIMEN HOLD: NORMAL
TROPONIN I SERPL HS-MCNC: 6 NG/L (ref 0–51)
WBC # BLD AUTO: 5.9 K/UL (ref 3.6–11)

## 2024-03-18 PROCEDURE — 84484 ASSAY OF TROPONIN QUANT: CPT

## 2024-03-18 PROCEDURE — 80053 COMPREHEN METABOLIC PANEL: CPT

## 2024-03-18 PROCEDURE — 6370000000 HC RX 637 (ALT 250 FOR IP)

## 2024-03-18 PROCEDURE — 85025 COMPLETE CBC W/AUTO DIFF WBC: CPT

## 2024-03-18 PROCEDURE — 93010 ELECTROCARDIOGRAM REPORT: CPT | Performed by: SPECIALIST

## 2024-03-18 PROCEDURE — 99285 EMERGENCY DEPT VISIT HI MDM: CPT

## 2024-03-18 PROCEDURE — 36415 COLL VENOUS BLD VENIPUNCTURE: CPT

## 2024-03-18 PROCEDURE — 70450 CT HEAD/BRAIN W/O DYE: CPT

## 2024-03-18 PROCEDURE — 93005 ELECTROCARDIOGRAM TRACING: CPT

## 2024-03-18 PROCEDURE — 71045 X-RAY EXAM CHEST 1 VIEW: CPT

## 2024-03-18 RX ORDER — BUTALBITAL, ACETAMINOPHEN AND CAFFEINE 50; 325; 40 MG/1; MG/1; MG/1
1 TABLET ORAL EVERY 6 HOURS PRN
Qty: 20 TABLET | Refills: 0 | Status: SHIPPED | OUTPATIENT
Start: 2024-03-18

## 2024-03-18 RX ORDER — AMLODIPINE BESYLATE 5 MG/1
5 TABLET ORAL
Status: COMPLETED | OUTPATIENT
Start: 2024-03-18 | End: 2024-03-18

## 2024-03-18 RX ORDER — ASPIRIN 325 MG
325 TABLET ORAL
Status: COMPLETED | OUTPATIENT
Start: 2024-03-18 | End: 2024-03-18

## 2024-03-18 RX ADMIN — ASPIRIN 325 MG: 325 TABLET ORAL at 11:56

## 2024-03-18 RX ADMIN — AMLODIPINE BESYLATE 5 MG: 5 TABLET ORAL at 12:12

## 2024-03-18 ASSESSMENT — PAIN DESCRIPTION - LOCATION: LOCATION: HEAD

## 2024-03-18 ASSESSMENT — PAIN SCALES - GENERAL: PAINLEVEL_OUTOF10: 7

## 2024-03-18 ASSESSMENT — PAIN DESCRIPTION - ORIENTATION: ORIENTATION: ANTERIOR

## 2024-03-18 ASSESSMENT — PAIN - FUNCTIONAL ASSESSMENT: PAIN_FUNCTIONAL_ASSESSMENT: 0-10

## 2024-03-18 ASSESSMENT — PAIN DESCRIPTION - DESCRIPTORS: DESCRIPTORS: ACHING

## 2024-03-18 NOTE — ED TRIAGE NOTES
Pt arrives to the ER for complaints of left sided facial numbness that started in January, headache that started in February and HTN.     Pt states that she feels her eyes are sore. Reports that she has had intermittent blurry vision that started a few weeks ago.     Pt reports that she was at her PCP office this morning for medication refills and was found to be HTN with SBP in the 180's.     Reports taking metoprolol and losartan.     PMH of TIA and migraines.

## 2024-03-18 NOTE — ED PROVIDER NOTES
[facility] EMERGENCY DEPT  EMERGENCY DEPARTMENT ENCOUNTER      Pt Name: Rin Montalvo  MRN: [id number]  Birthdate 1960  Date of evaluation: 3/18/2024  Provider: Mony Vasquez PA-C    CHIEF COMPLAINT       Chief Complaint   Patient presents with    Numbness    Hypertension         HISTORY OF PRESENT ILLNESS   (Location/Symptom, Timing/Onset, Context/Setting, Quality, Duration, Modifying Factors, Severity)  Note limiting factors.   Rin Motnalvo is a 64 y.o. female with history of  has a past medical history of Anxiety and depression, Arthritis, CAD (coronary artery disease), Cervical myopathy, Chronic pain, DDD, Fibromyalgia, History of MI (myocardial infarction), Hyperlipidemia, Hypertension, and Scoliosis, migraine headaches who presents from home to [facility] ED with cc of headache and elevated blood pressure.  Patient reports her headache has been ongoing since around mid February.  She reports left sided facial numbness since mid January.  She reports some tingling on both sides of the face.  She also reports a feeling of pressure behind her eyes.  Reports decreased energy. Patient took Losartan and metoprolol today. She was just prescribed amlodipine but hasn't started it yet stating it is ready at the pharmacy for her to . Patient reports light headedness intermittently. Denies dizziness.  Reports some shortness of breath.  Denies chest pain.  Denies speech problems, gait disturbance, decreased balance, problems, extremity numbness or weakness, memory loss. No recent surgeries, hospitalizations, travel, hx of malignancy, exogenous estrogen use, hemoptysis, LE pain/swelling, or hx of PE/DVT.     PCP: Jud Hunt, RAO - NP    There are no other complaints, changes or physical findings at this time.        The history is provided by the patient. No  was used.         Review of External Medical Records:     Nursing Notes were reviewed.    REVIEW OF SYSTEMS

## 2024-03-18 NOTE — DISCHARGE INSTRUCTIONS
You are seen today in the emergency department for headache, facial paresthesia, elevated blood pressure and shortness of breath.  Your workup was reassuring and showed no evidence of anemia, infection, electrolyte abnormalities, kidney injury, acute coronary syndrome.  The CT of your head shows no abnormality.  You should take all blood pressure medications as prescribed.  Follow-up with your primary care provider shortly.  Return to the emergency department if you have any new or worsening symptoms.

## 2024-09-03 ENCOUNTER — HOSPITAL ENCOUNTER (OUTPATIENT)
Dept: VASCULAR SURGERY | Facility: HOSPITAL | Age: 64
Discharge: HOME | End: 2024-09-05
Payer: MEDICARE

## 2024-09-03 DIAGNOSIS — Z41.9 SURGERY, ELECTIVE: ICD-10-CM

## 2024-09-03 PROCEDURE — 93880 EXTRACRANIAL BILAT STUDY: CPT

## 2024-09-04 LAB
VAS LEFT CCA DIST EDV: 20.8 CM/S
VAS LEFT CCA DIST PSV: 70.2 CM/S
VAS LEFT CCA PROX EDV: 23 CM/S
VAS LEFT CCA PROX PSV: 95.5 CM/S
VAS LEFT ECA EDV: 18.1 CM/S
VAS LEFT ECA PSV: 82 CM/S
VAS LEFT ICA DIST EDV: 25.1 CM/S
VAS LEFT ICA DIST PSV: 59.2 CM/S
VAS LEFT ICA MID EDV: 30.6 CM/S
VAS LEFT ICA MID PSV: 64.7 CM/S
VAS LEFT ICA PROX EDV: 19.7 CM/S
VAS LEFT ICA PROX PSV: 59.2 CM/S
VAS LEFT ICA/CCA PSV: 0.9 NO UNITS
VAS LEFT SUBCLAVIAN PROX EDV: 0 CM/S
VAS LEFT SUBCLAVIAN PROX PSV: 199.3 CM/S
VAS LEFT VERTEBRAL EDV: 28.3 CM/S
VAS LEFT VERTEBRAL PSV: 83.2 CM/S
VAS RIGHT CCA DIST EDV: 30.8 CM/S
VAS RIGHT CCA DIST PSV: 90.3 CM/S
VAS RIGHT CCA PROX EDV: 15.3 CM/S
VAS RIGHT CCA PROX PSV: 87.8 CM/S
VAS RIGHT ECA EDV: 30.8 CM/S
VAS RIGHT ECA PSV: 131.7 CM/S
VAS RIGHT ICA DIST EDV: 31.8 CM/S
VAS RIGHT ICA DIST PSV: 74.8 CM/S
VAS RIGHT ICA MID EDV: 24.4 CM/S
VAS RIGHT ICA MID PSV: 58.8 CM/S
VAS RIGHT ICA PROX EDV: 8.6 CM/S
VAS RIGHT ICA PROX PSV: 67.8 CM/S
VAS RIGHT ICA/CCA PSV: 0.8 NO UNITS
VAS RIGHT SUBCLAVIAN PROX EDV: 12.7 CM/S
VAS RIGHT SUBCLAVIAN PROX PSV: 113.8 CM/S
VAS RIGHT VERTEBRAL EDV: 19.5 CM/S
VAS RIGHT VERTEBRAL PSV: 47.8 CM/S